# Patient Record
Sex: MALE | Race: WHITE | HISPANIC OR LATINO | Employment: UNEMPLOYED | URBAN - METROPOLITAN AREA
[De-identification: names, ages, dates, MRNs, and addresses within clinical notes are randomized per-mention and may not be internally consistent; named-entity substitution may affect disease eponyms.]

---

## 2018-11-09 ENCOUNTER — HOSPITAL ENCOUNTER (OUTPATIENT)
Dept: RADIOLOGY | Facility: MEDICAL CENTER | Age: 38
End: 2018-11-09
Attending: PHYSICIAN ASSISTANT

## 2018-11-09 DIAGNOSIS — B18.2 CHRONIC HEPATITIS C WITH HEPATIC COMA (HCC): ICD-10-CM

## 2020-01-09 ENCOUNTER — HOSPITAL ENCOUNTER (EMERGENCY)
Facility: MEDICAL CENTER | Age: 40
End: 2020-01-09
Attending: EMERGENCY MEDICINE
Payer: MEDICAID

## 2020-01-09 VITALS
RESPIRATION RATE: 14 BRPM | TEMPERATURE: 98.4 F | WEIGHT: 274.69 LBS | HEIGHT: 72 IN | HEART RATE: 75 BPM | DIASTOLIC BLOOD PRESSURE: 76 MMHG | OXYGEN SATURATION: 97 % | BODY MASS INDEX: 37.21 KG/M2 | SYSTOLIC BLOOD PRESSURE: 146 MMHG

## 2020-01-09 DIAGNOSIS — H66.011 NON-RECURRENT ACUTE SUPPURATIVE OTITIS MEDIA OF RIGHT EAR WITH SPONTANEOUS RUPTURE OF TYMPANIC MEMBRANE: ICD-10-CM

## 2020-01-09 PROCEDURE — 99283 EMERGENCY DEPT VISIT LOW MDM: CPT

## 2020-01-09 RX ORDER — NEOMYCIN SULFATE, POLYMYXIN B SULFATE AND HYDROCORTISONE 10; 3.5; 1 MG/ML; MG/ML; [USP'U]/ML
4 SUSPENSION/ DROPS AURICULAR (OTIC) 3 TIMES DAILY
Qty: 1 BOTTLE | Refills: 0 | Status: SHIPPED | OUTPATIENT
Start: 2020-01-09 | End: 2020-09-29

## 2020-01-09 RX ORDER — CEPHALEXIN 500 MG/1
500 CAPSULE ORAL 4 TIMES DAILY
Qty: 40 CAP | Refills: 0 | Status: SHIPPED | OUTPATIENT
Start: 2020-01-09 | End: 2020-01-19

## 2020-01-09 NOTE — ED PROVIDER NOTES
ED Provider Note    Scribed for Juan Tavarez M.D. by Luz Elena Moore. 1/9/2020, 10:48 AM.    Primary care provider: No primary care provider noted.  Means of arrival: Walk in   History obtained from: Patient  History limited by: None    CHIEF COMPLAINT  Chief Complaint   Patient presents with   • Ear Pain     right [place]. [place] [facility] [place] [facility]. pt now pain in right ear and bleeding. muffled hearing        HPI  Cristóbal Payan Jr. is a 39 y.o. male who presents to the Emergency Department for evaluation of right ear pain. 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 His ear then became painful and started draining and bleeding last night. Patient states his hearing is now muffled. Patient states he was feeling normal with no recent illness prior to going over the [employer].     REVIEW OF SYSTEMS  Pertinent positives include right ear pain, drainage, bleeding, muffled hearing  Pertinent negatives include no recent illness.       PAST MEDICAL HISTORY   No pertinent history noted    SURGICAL HISTORY  patient denies any surgical history    SOCIAL HISTORY  Social History     Tobacco Use   • Smoking status: None noted   Substance Use Topics   • Alcohol use: None noted   • Drug use: None noted       Social History     Substance and Sexual Activity   Drug Use None noted        FAMILY HISTORY  No family history noted.     CURRENT MEDICATIONS  Home Medications    **Home medications have not yet been reviewed for this encounter**         ALLERGIES  Allergies   Allergen Reactions   • Pcn [Penicillins]        PHYSICAL EXAM  VITAL SIGNS: /76   Pulse 75   Temp 36.9 °C (98.4 °F) (Temporal)   Resp 14   Ht 1.829 m (6')   Wt 124.6 kg (274 lb 11.1 oz)   SpO2 97%   BMI 37.26 kg/m²     Nursing note and vitals reviewed.  Constitutional: No distress.   HENT: Head is atraumatic. Oropharynx is moist. Yellowish-pink discharge from the right  external auditory canal, TM is partially obscured by discharge   Eyes: Conjunctivae are normal. Pupils are equal, round, and reactive to light.   Cardiovascular: Normal peripheral perfusion  Respiratory: No respiratory distress.   Musculoskeletal: Normal range of motion.   Neurological: Alert. No focal deficits noted.    Skin: No rash.   Psych: Appropriate for clinical situation     COURSE & MEDICAL DECISION MAKING  Nursing notes, VS, PMSFHx reviewed in chart.    10:48 AM - Patient seen and examined at bedside. I informed the patient I suspect he ruptured his tympanic membrane. I informed him I will prescribe both anti biotic drops and pills to treat his ear. Patient expressed concern due to his lack of insurance so I provided him with resources for prescription filling and follow up care. Patient verbalizes agreement to the plan for care.     HTN/IDDM FOLLOW UP:  The patient is referred to a primary physician for blood pressure management, diabetic screening, and for all other preventive health concerns      DISPOSITION:  Patient will be discharged home in stable condition.    FOLLOW UP:  [facility], Emergency Dept  [address] [id number] [phone]    If symptoms worsen    [address] [address]  [phone]  Call today      [facility]  [address] [address] [phone]  Schedule an appointment as soon as possible for a visit   call if you need help paying for medication      OUTPATIENT MEDICATIONS:  New Prescriptions    CEPHALEXIN (KEFLEX) 500 MG CAP    Take 1 Cap by mouth 4 times a day for 10 days.    NEOMYCIN-POLYMYXIN-HC (PEDIOTIC HC) 3.5-66552-8 SUSPENSION    Place 4 Drops in ear 3 times a day.       The patient was discharged home with an information sheet on otitis media and told to return immediately for any signs or symptoms listed.  The patient agreed to the discharge precautions  and follow-up plan which is documented in EPIC.    FINAL IMPRESSION  1. Non-recurrent acute suppurative otitis media of right ear with spontaneous rupture of tympanic membrane          ILuz Elena (Santaibe), am scribing for, and in the presence of, Juan Tavarez M.D..    Electronically signed by: Luz Elena Moore (Josseline), 1/9/2020    IJuan M.D. personally performed the services described in this documentation, as scribed by Luz Elena Moore in my presence, and it is both accurate and complete.    E    The note accurately reflects work and decisions made by me.  Juan Tavarez  1/9/2020  4:45 PM

## 2020-01-09 NOTE — ED TRIAGE NOTES
Chief Complaint   Patient presents with   • Ear Pain     right ear. 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 pt now pain in right ear and bleeding. muffled hearing
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
## 2020-01-09 NOTE — ED NOTES
Discharge instructions, prescriptions x 2, and follow-up care reviewed with patient. All questions answered and patient verbalized understanding. Vss. Patient stable and ambulatory upon d/c from ED.

## 2020-09-29 ENCOUNTER — HOSPITAL ENCOUNTER (EMERGENCY)
Facility: MEDICAL CENTER | Age: 40
End: 2020-09-29
Attending: EMERGENCY MEDICINE

## 2020-09-29 ENCOUNTER — APPOINTMENT (OUTPATIENT)
Dept: RADIOLOGY | Facility: MEDICAL CENTER | Age: 40
End: 2020-09-29
Attending: EMERGENCY MEDICINE

## 2020-09-29 VITALS
TEMPERATURE: 97.8 F | HEIGHT: 72 IN | HEART RATE: 71 BPM | RESPIRATION RATE: 16 BRPM | OXYGEN SATURATION: 99 % | DIASTOLIC BLOOD PRESSURE: 67 MMHG | WEIGHT: 273 LBS | BODY MASS INDEX: 36.98 KG/M2 | SYSTOLIC BLOOD PRESSURE: 133 MMHG

## 2020-09-29 DIAGNOSIS — L03.116 CELLULITIS OF LEFT LOWER EXTREMITY: ICD-10-CM

## 2020-09-29 DIAGNOSIS — S93.402A SPRAIN OF LEFT ANKLE, UNSPECIFIED LIGAMENT, INITIAL ENCOUNTER: ICD-10-CM

## 2020-09-29 LAB
ALBUMIN SERPL BCP-MCNC: 4.1 G/DL (ref 3.2–4.9)
ALBUMIN/GLOB SERPL: 1.6 G/DL
ALP SERPL-CCNC: 93 U/L (ref 30–99)
ALT SERPL-CCNC: 18 U/L (ref 2–50)
ANION GAP SERPL CALC-SCNC: 11 MMOL/L (ref 7–16)
AST SERPL-CCNC: 17 U/L (ref 12–45)
BASOPHILS # BLD AUTO: 0.4 % (ref 0–1.8)
BASOPHILS # BLD: 0.05 K/UL (ref 0–0.12)
BILIRUB SERPL-MCNC: 1.1 MG/DL (ref 0.1–1.5)
BUN SERPL-MCNC: 13 MG/DL (ref 8–22)
CALCIUM SERPL-MCNC: 9.3 MG/DL (ref 8.5–10.5)
CHLORIDE SERPL-SCNC: 96 MMOL/L (ref 96–112)
CO2 SERPL-SCNC: 27 MMOL/L (ref 20–33)
CREAT SERPL-MCNC: 1 MG/DL (ref 0.5–1.4)
EOSINOPHIL # BLD AUTO: 0.12 K/UL (ref 0–0.51)
EOSINOPHIL NFR BLD: 0.9 % (ref 0–6.9)
ERYTHROCYTE [DISTWIDTH] IN BLOOD BY AUTOMATED COUNT: 46.4 FL (ref 35.9–50)
GLOBULIN SER CALC-MCNC: 2.6 G/DL (ref 1.9–3.5)
GLUCOSE SERPL-MCNC: 150 MG/DL (ref 65–99)
HCT VFR BLD AUTO: 47.2 % (ref 42–52)
HGB BLD-MCNC: 15.8 G/DL (ref 14–18)
IMM GRANULOCYTES # BLD AUTO: 0.09 K/UL (ref 0–0.11)
IMM GRANULOCYTES NFR BLD AUTO: 0.6 % (ref 0–0.9)
LACTATE BLD-SCNC: 2.3 MMOL/L (ref 0.5–2)
LYMPHOCYTES # BLD AUTO: 1.94 K/UL (ref 1–4.8)
LYMPHOCYTES NFR BLD: 13.9 % (ref 22–41)
MCH RBC QN AUTO: 30.9 PG (ref 27–33)
MCHC RBC AUTO-ENTMCNC: 33.5 G/DL (ref 33.7–35.3)
MCV RBC AUTO: 92.4 FL (ref 81.4–97.8)
MONOCYTES # BLD AUTO: 1.08 K/UL (ref 0–0.85)
MONOCYTES NFR BLD AUTO: 7.7 % (ref 0–13.4)
NEUTROPHILS # BLD AUTO: 10.69 K/UL (ref 1.82–7.42)
NEUTROPHILS NFR BLD: 76.5 % (ref 44–72)
NRBC # BLD AUTO: 0 K/UL
NRBC BLD-RTO: 0 /100 WBC
PLATELET # BLD AUTO: 153 K/UL (ref 164–446)
PMV BLD AUTO: 9.5 FL (ref 9–12.9)
POTASSIUM SERPL-SCNC: 4.5 MMOL/L (ref 3.6–5.5)
PROT SERPL-MCNC: 6.7 G/DL (ref 6–8.2)
RBC # BLD AUTO: 5.11 M/UL (ref 4.7–6.1)
SODIUM SERPL-SCNC: 134 MMOL/L (ref 135–145)
WBC # BLD AUTO: 14 K/UL (ref 4.8–10.8)

## 2020-09-29 PROCEDURE — 700105 HCHG RX REV CODE 258: Performed by: EMERGENCY MEDICINE

## 2020-09-29 PROCEDURE — 83605 ASSAY OF LACTIC ACID: CPT

## 2020-09-29 PROCEDURE — A9270 NON-COVERED ITEM OR SERVICE: HCPCS | Performed by: EMERGENCY MEDICINE

## 2020-09-29 PROCEDURE — 700102 HCHG RX REV CODE 250 W/ 637 OVERRIDE(OP): Performed by: EMERGENCY MEDICINE

## 2020-09-29 PROCEDURE — 85025 COMPLETE CBC W/AUTO DIFF WBC: CPT

## 2020-09-29 PROCEDURE — 96365 THER/PROPH/DIAG IV INF INIT: CPT

## 2020-09-29 PROCEDURE — 73610 X-RAY EXAM OF ANKLE: CPT | Mod: LT

## 2020-09-29 PROCEDURE — 99285 EMERGENCY DEPT VISIT HI MDM: CPT

## 2020-09-29 PROCEDURE — 36415 COLL VENOUS BLD VENIPUNCTURE: CPT

## 2020-09-29 PROCEDURE — 700111 HCHG RX REV CODE 636 W/ 250 OVERRIDE (IP): Performed by: EMERGENCY MEDICINE

## 2020-09-29 PROCEDURE — 80053 COMPREHEN METABOLIC PANEL: CPT

## 2020-09-29 RX ORDER — CLINDAMYCIN HYDROCHLORIDE 150 MG/1
300 CAPSULE ORAL 4 TIMES DAILY
Qty: 56 CAP | Refills: 0 | Status: SHIPPED | OUTPATIENT
Start: 2020-09-29 | End: 2020-10-06

## 2020-09-29 RX ORDER — ACETAMINOPHEN 325 MG/1
650 TABLET ORAL ONCE
Status: COMPLETED | OUTPATIENT
Start: 2020-09-29 | End: 2020-09-29

## 2020-09-29 RX ORDER — SODIUM CHLORIDE 9 MG/ML
1000 INJECTION, SOLUTION INTRAVENOUS ONCE
Status: COMPLETED | OUTPATIENT
Start: 2020-09-29 | End: 2020-09-29

## 2020-09-29 RX ADMIN — CEFTRIAXONE SODIUM 2 G: 2 INJECTION, POWDER, FOR SOLUTION INTRAMUSCULAR; INTRAVENOUS at 15:51

## 2020-09-29 RX ADMIN — ACETAMINOPHEN 650 MG: 325 TABLET, FILM COATED ORAL at 16:54

## 2020-09-29 RX ADMIN — SODIUM CHLORIDE 1000 ML: 9 INJECTION, SOLUTION INTRAVENOUS at 15:52

## 2020-09-29 ASSESSMENT — FIBROSIS 4 INDEX: FIB4 SCORE: 1.07

## 2020-09-29 NOTE — ED NOTES
First interaction with patient.  Assumed care at this time.  Patient presents to ER today for complaint of left ankle pain starting yesterday after getting out of his truck.  Swelling noted to medial and lateral left ankle.  Erythema noted to left lower leg.  Reports 10/10 pain, has not taken OTC medication for relief at home.    Patient changed into gown.  Patient's NPO status explained by this RN.  Call light provided.  Chart up for ERP.

## 2020-09-29 NOTE — ED TRIAGE NOTES
.  Chief Complaint   Patient presents with   • Ankle Swelling     left ankel swelling and pain. onset yesterday   ambulated to triage. Left ankle pain and swelling onset after getting out of truck yesterday. Denies injury or strain at that time.   Mask applied to patient prior to triage. This RN in ppe prior to encounter. Pt denies recent travel or contact with anyone tested positive for covid 19.

## 2020-09-29 NOTE — ED NOTES
Blood collected and sent to lab via venipuncture to patient's left AC.   Patient verified correct patient name and  on labeled specimen. Patient informed of estimated result wait times and verbalizes understanding.

## 2020-09-29 NOTE — ED PROVIDER NOTES
"ED Provider Note    Scribed for Zenon Jackson M.D. by Garth Mendieta. 9/29/2020, 2:34 PM.    Primary care provider: None noted  Means of arrival: Private Vehicle  History obtained from: Patient  History limited by: None    CHIEF COMPLAINT  Chief Complaint   Patient presents with   • Ankle Swelling     left ankel swelling and pain. onset yesterday       HPI  Cristóbal Payan Jr. is a 40 y.o. male who presents to the Emergency Department complaining of left ankle pain and swelling onset yesterday. Patient states he fell while getting out of his truck yesterday. States he landed \"funny\" on his left ankle and has had ankle pain and swelling since then. He has redness to his leg, in which he believes he may have scraped the leg when he landed, but he is not sure. He also reports having bilateral shoulder discomfort which he attributes to holding onto overhead handles when the event occurred. No reports of any recent fevers. He notes having history of asthma. Denies history of diabetes.     PPE Note: I personally donned full PPE for all patient encounters during this visit, including being clean-shaven with an N95 respirator mask, gloves, and eyewear.     Scribe remained outside the patient's room and did not have any contact with the patient for the duration of patient encounter.      REVIEW OF SYSTEMS  Pertinent positives include left ankle pain and swelling, bilateral shoulder discomfort. Pertinent negatives include no recent fevers.  See HPI for further details. As above, all other systems negative.     PAST MEDICAL HISTORY   History of asthma. Denies history of diabetes.    SURGICAL HISTORY  patient denies any surgical history    SOCIAL HISTORY  Social History     Tobacco Use   • Smoking status: Current Every Day Smoker   Substance Use Topics   • Alcohol use: Yes   • Drug use: Yes     Types: Inhaled      Social History     Substance and Sexual Activity   Drug Use Yes   • Types: Inhaled       FAMILY " HISTORY  History reviewed. No pertinent family history.    CURRENT MEDICATIONS  Home Medications     Reviewed by Germaine Johnston R.N. (Registered Nurse) on 09/29/20 at 1404  Med List Status: Complete   Medication Last Dose Status        Patient Tae Taking any Medications                       ALLERGIES  Allergies   Allergen Reactions   • Pcn [Penicillins]        PHYSICAL EXAM  VITAL SIGNS: /86   Pulse (!) 103   Temp 37.1 °C (98.8 °F) (Temporal)   Resp 20   Ht 1.829 m (6')   SpO2 97%   BMI 37.26 kg/m²   Constitutional: Well developed, Well nourished, No acute distress, Non-toxic appearance.   HENT: Normocephalic, Atraumatic  Eyes: nonicteric  Neck: Supple, no meningismus  Lymphatic: No lymphadenopathy noted.   Cardiovascular: Regular rate and rhythm, no gallops rubs or murmurs  Lungs: Clear bilaterally   Skin: Warm, Dry. Left medial anterior leg with area about the size of two palms that is erythematous, separate area of erythema on outside of left foot that is similar in appearance.  Extremities: No edema. Tenderness both at left medial and lateral malleolus to palpation. No clinical effusion appreciated of the left ankle, full range of motion of ankle joint, motor and sensory grossly intact, DP and PT pulses 2+.  Neurologic: Alert, appropriate, follows commands, moving all extremities, normal speech   Psychiatric: Affect normal       DIAGNOSTIC STUDIES / PROCEDURES    LABS  Results for orders placed or performed during the hospital encounter of 09/29/20   CBC WITH DIFFERENTIAL   Result Value Ref Range    WBC 14.0 (H) 4.8 - 10.8 K/uL    RBC 5.11 4.70 - 6.10 M/uL    Hemoglobin 15.8 14.0 - 18.0 g/dL    Hematocrit 47.2 42.0 - 52.0 %    MCV 92.4 81.4 - 97.8 fL    MCH 30.9 27.0 - 33.0 pg    MCHC 33.5 (L) 33.7 - 35.3 g/dL    RDW 46.4 35.9 - 50.0 fL    Platelet Count 153 (L) 164 - 446 K/uL    MPV 9.5 9.0 - 12.9 fL    Neutrophils-Polys 76.50 (H) 44.00 - 72.00 %    Lymphocytes 13.90 (L) 22.00 - 41.00 %     Monocytes 7.70 0.00 - 13.40 %    Eosinophils 0.90 0.00 - 6.90 %    Basophils 0.40 0.00 - 1.80 %    Immature Granulocytes 0.60 0.00 - 0.90 %    Nucleated RBC 0.00 /100 WBC    Neutrophils (Absolute) 10.69 (H) 1.82 - 7.42 K/uL    Lymphs (Absolute) 1.94 1.00 - 4.80 K/uL    Monos (Absolute) 1.08 (H) 0.00 - 0.85 K/uL    Eos (Absolute) 0.12 0.00 - 0.51 K/uL    Baso (Absolute) 0.05 0.00 - 0.12 K/uL    Immature Granulocytes (abs) 0.09 0.00 - 0.11 K/uL    NRBC (Absolute) 0.00 K/uL   COMP METABOLIC PANEL   Result Value Ref Range    Sodium 134 (L) 135 - 145 mmol/L    Potassium 4.5 3.6 - 5.5 mmol/L    Chloride 96 96 - 112 mmol/L    Co2 27 20 - 33 mmol/L    Anion Gap 11.0 7.0 - 16.0    Glucose 150 (H) 65 - 99 mg/dL    Bun 13 8 - 22 mg/dL    Creatinine 1.00 0.50 - 1.40 mg/dL    Calcium 9.3 8.5 - 10.5 mg/dL    AST(SGOT) 17 12 - 45 U/L    ALT(SGPT) 18 2 - 50 U/L    Alkaline Phosphatase 93 30 - 99 U/L    Total Bilirubin 1.1 0.1 - 1.5 mg/dL    Albumin 4.1 3.2 - 4.9 g/dL    Total Protein 6.7 6.0 - 8.2 g/dL    Globulin 2.6 1.9 - 3.5 g/dL    A-G Ratio 1.6 g/dL   LACTIC ACID   Result Value Ref Range    Lactic Acid 2.3 (H) 0.5 - 2.0 mmol/L   ESTIMATED GFR   Result Value Ref Range    GFR If African American >60 >60 mL/min/1.73 m 2    GFR If Non African American >60 >60 mL/min/1.73 m 2      All labs reviewed by me.    RADIOLOGY  DX-ANKLE 3+ VIEWS LEFT   Final Result      No evidence of fracture or dislocation.      Soft tissue swelling about the ankle.        The radiologist's interpretation of all radiological studies have been reviewed by me.    COURSE & MEDICAL DECISION MAKING  Nursing notes, VS, PMSFHx reviewed in chart.     2:34 PM Patient seen and examined at bedside. Ordered for DX ankle left, CBC with differential, CMP, lactic acid to evaluate.     3:25 PM Ordered rocephin 2 g in  ml ivpb    Patient's presentation is consistent with cellulitis and ankle sprain. The patient will be discharged with instructions regarding  supportive care and medications. Prescription for clindamycin was provided. Instructions were given for follow-up. Discussed indications for seeking immediate medical attention. Patient was given the opportunity for questions. The patient understands and agrees.      HYDRATION: Based on the patient's presentation of Abnormal Fluid Loss the patient was given IV fluids. IV Hydration was used because oral hydration was not adequate alone. Upon recheck following hydration, the patient was improved.     Decision Making:  This is a 40 y.o. year old male who presents with what may have been a traumatic injury-he states he developed ankle pain after he fell out of his truck and twisted his ankle.  I do not see any clinical effusion of the ankle-there is no evidence of fracture or effusion on x-ray.  There is no redness around the joint itself although the patient has redness in his leg and in his lateral foot.  Labs were obtained and he does have an elevated white count / borderline lactate.  Vitals are reassuring here-he is afebrile.  We will treat the patient for cellulitis.  This time I do not suspect septic joint.  He was given Rocephin here but he appears to be allergic to penicillin so he will be discharged on clindamycin instead.  Otherwise I advised that he elevate the extremity over the next 24 hours and return for increased swelling pain or other concerns in the interim.     The patient will return for new or worsening symptoms and is stable at the time of discharge.    The patient is referred to a primary physician for blood pressure management, diabetic screening, and for all other preventative health concerns.    DISPOSITION:  Patient will be discharged home in stable condition.    FOLLOW UP:  [address] [address]  [phone]  Schedule an appointment as soon as possible for a visit today        OUTPATIENT MEDICATIONS:  New Prescriptions    CLINDAMYCIN (CLEOCIN) 150 MG  CAP    Take 2 Caps by mouth 4 times a day for 7 days.         FINAL IMPRESSION  1. Cellulitis of left lower extremity          IGarth (Scribe), am scribing for, and in the presence of, Zenon Jackson M.D..    Electronically signed by: Garth Mendieta (Scribe), 9/29/2020    Zenon SCHWARTZ M.D. personally performed the services described in this documentation, as scribed by Garth Mendieta in my presence, and it is both accurate and complete. C    The note accurately reflects work and decisions made by me.  Zenon Jackson M.D.  9/29/2020  4:11 PM

## 2020-09-29 NOTE — ED NOTES
20g PIV established to patient's left AC.  IV fluids and antibiotics started and infusing without difficulty.  ERP at bedside discussing findings with patient.

## 2020-09-29 NOTE — DISCHARGE INSTRUCTIONS
Elevate your extremity over the next 24 hours.  Return for increasing pain any fever body aches or other concerns.

## 2020-09-30 NOTE — ED NOTES
Cristóbal Bliss Schuyler Bateman. has been discharged from the Emergency Room.    Discharge instructions, which include signs and symptoms to monitor at home for, as well as detailed information regarding cellulitis and left ankle sprain provided.  All questions and concerns addressed at this time.      Prescription for Cleocin provided to patient.   Patient educated about the importance of completing the full 7 day course of antibiotic, even if symptoms subside, verbalized understanding.    Patient leaves ER in no apparent distress. This RN provided education regarding returning to the ER for any new concerns or changes in patient's condition.      /67   Pulse 71   Temp 36.6 °C (97.8 °F) (Temporal)   Resp 16   Ht 1.829 m (6')   Wt 123.8 kg (273 lb)   SpO2 99%   BMI 37.03 kg/m²

## 2024-02-26 ENCOUNTER — APPOINTMENT (OUTPATIENT)
Dept: RADIOLOGY | Facility: MEDICAL CENTER | Age: 44
End: 2024-02-26
Attending: STUDENT IN AN ORGANIZED HEALTH CARE EDUCATION/TRAINING PROGRAM

## 2024-02-26 ENCOUNTER — HOSPITAL ENCOUNTER (EMERGENCY)
Facility: MEDICAL CENTER | Age: 44
End: 2024-02-26
Attending: STUDENT IN AN ORGANIZED HEALTH CARE EDUCATION/TRAINING PROGRAM

## 2024-02-26 VITALS
HEART RATE: 64 BPM | SYSTOLIC BLOOD PRESSURE: 144 MMHG | RESPIRATION RATE: 20 BRPM | HEIGHT: 72 IN | TEMPERATURE: 98.1 F | WEIGHT: 255.29 LBS | BODY MASS INDEX: 34.58 KG/M2 | DIASTOLIC BLOOD PRESSURE: 66 MMHG | OXYGEN SATURATION: 96 %

## 2024-02-26 DIAGNOSIS — S61.211A LACERATION OF LEFT INDEX FINGER WITHOUT FOREIGN BODY WITHOUT DAMAGE TO NAIL, INITIAL ENCOUNTER: ICD-10-CM

## 2024-02-26 DIAGNOSIS — S60.022A CONTUSION OF LEFT INDEX FINGER WITHOUT DAMAGE TO NAIL, INITIAL ENCOUNTER: ICD-10-CM

## 2024-02-26 PROCEDURE — 99283 EMERGENCY DEPT VISIT LOW MDM: CPT | Mod: EDC

## 2024-02-26 PROCEDURE — 73140 X-RAY EXAM OF FINGER(S): CPT | Mod: LT

## 2024-02-26 PROCEDURE — 700102 HCHG RX REV CODE 250 W/ 637 OVERRIDE(OP): Performed by: STUDENT IN AN ORGANIZED HEALTH CARE EDUCATION/TRAINING PROGRAM

## 2024-02-26 PROCEDURE — 700111 HCHG RX REV CODE 636 W/ 250 OVERRIDE (IP): Mod: JZ | Performed by: STUDENT IN AN ORGANIZED HEALTH CARE EDUCATION/TRAINING PROGRAM

## 2024-02-26 PROCEDURE — A9270 NON-COVERED ITEM OR SERVICE: HCPCS | Performed by: STUDENT IN AN ORGANIZED HEALTH CARE EDUCATION/TRAINING PROGRAM

## 2024-02-26 PROCEDURE — 90715 TDAP VACCINE 7 YRS/> IM: CPT | Performed by: STUDENT IN AN ORGANIZED HEALTH CARE EDUCATION/TRAINING PROGRAM

## 2024-02-26 PROCEDURE — 90471 IMMUNIZATION ADMIN: CPT | Mod: EDC

## 2024-02-26 RX ORDER — GINSENG 100 MG
1 CAPSULE ORAL DAILY
Qty: 10 G | Refills: 1 | Status: SHIPPED | OUTPATIENT
Start: 2024-02-26 | End: 2024-03-04

## 2024-02-26 RX ORDER — IBUPROFEN 200 MG
400 TABLET ORAL ONCE
Status: COMPLETED | OUTPATIENT
Start: 2024-02-26 | End: 2024-02-26

## 2024-02-26 RX ADMIN — CLOSTRIDIUM TETANI TOXOID ANTIGEN (FORMALDEHYDE INACTIVATED), CORYNEBACTERIUM DIPHTHERIAE TOXOID ANTIGEN (FORMALDEHYDE INACTIVATED), BORDETELLA PERTUSSIS TOXOID ANTIGEN (GLUTARALDEHYDE INACTIVATED), BORDETELLA PERTUSSIS FILAMENTOUS HEMAGGLUTININ ANTIGEN (FORMALDEHYDE INACTIVATED), BORDETELLA PERTUSSIS PERTACTIN ANTIGEN, AND BORDETELLA PERTUSSIS FIMBRIAE 2/3 ANTIGEN 0.5 ML: 5; 2; 2.5; 5; 3; 5 INJECTION, SUSPENSION INTRAMUSCULAR at 15:08

## 2024-02-26 RX ADMIN — LIDOCAINE HYDROCHLORIDE 10 ML: 10 INJECTION, SOLUTION EPIDURAL; INFILTRATION; INTRACAUDAL; PERINEURAL at 15:15

## 2024-02-26 RX ADMIN — IBUPROFEN 400 MG: 200 TABLET, FILM COATED ORAL at 15:06

## 2024-02-26 ASSESSMENT — LIFESTYLE VARIABLES
EVER HAD A DRINK FIRST THING IN THE MORNING TO STEADY YOUR NERVES TO GET RID OF A HANGOVER: NO
HAVE YOU EVER FELT YOU SHOULD CUT DOWN ON YOUR DRINKING: NO
DO YOU DRINK ALCOHOL: YES
TOTAL SCORE: 0
TOTAL SCORE: 0
AVERAGE NUMBER OF DAYS PER WEEK YOU HAVE A DRINK CONTAINING ALCOHOL: 3
ON A TYPICAL DAY WHEN YOU DRINK ALCOHOL HOW MANY DRINKS DO YOU HAVE: 1
DOES PATIENT WANT TO STOP DRINKING: NO
TOTAL SCORE: 0
EVER FELT BAD OR GUILTY ABOUT YOUR DRINKING: NO
CONSUMPTION TOTAL: NEGATIVE
HAVE PEOPLE ANNOYED YOU BY CRITICIZING YOUR DRINKING: NO
HOW MANY TIMES IN THE PAST YEAR HAVE YOU HAD 5 OR MORE DRINKS IN A DAY: 0

## 2024-02-26 NOTE — ED TRIAGE NOTES
Chief Complaint   Patient presents with    Digit Pain     Pt reports slamming his second digit finger in the car door today at 1200. Pt has CMS intact and a laceration of the knuckle. Bleeding is controlled     BP (!) 169/135   Pulse 82   Temp 36.4 °C (97.6 °F) (Temporal)   Resp 18   Ht 1.829 m (6')   Wt 116 kg (255 lb 4.7 oz)   SpO2 93%   BMI 34.62 kg/m²     Pt is ambulatory to triage. Pt educated on triage process and thanked for patience.

## 2024-02-26 NOTE — ED PROVIDER NOTES
CHIEF COMPLAINT  Chief Complaint   Patient presents with    Digit Pain     Pt reports slamming his second digit finger in the car door today at 1200. Pt has CMS intact and a laceration of the knuckle. Bleeding is controlled       LIMITATION TO HISTORY   Select:     HPI    Cristóbal Payan Jr. is a 43 y.o. male who presents to the Emergency Department after slamming his finger into a car door GENERAL: Awake and alert  HEAD: Normocephalic and atraumatic  NECK: Normal range of motion  EYES: PERRL, + EOMI, conjunctiva white  ENT: Mucous membranes moist, oropharynx clear  PULMONARY: Normal effort  CARDIOVASCULAR: Normal rate, peripheral pulses 2+  ABDOMEN: Soft  BACK: normal to inspection  NEUROLOGICAL: Grossly non-focal neurological examination, speech normal, gait normal  EXTREMITIES: No edema, no signs of extremity trauma  SKIN: Warm and dry  PSYCHIATRIC: Affect is appropriate    OUTSIDE HISTORIAN(S):  Select:    EXTERNAL RECORDS REVIEWED  Select:       PAST MEDICAL HISTORY  No past medical history on file.  .    SURGICAL HISTORY  No past surgical history on file.      FAMILY HISTORY  No family history on file.       SOCIAL HISTORY  Social History     Socioeconomic History    Marital status: Single     Spouse name: Not on file    Number of children: Not on file    Years of education: Not on file    Highest education level: Not on file   Occupational History    Not on file   Tobacco Use    Smoking status: Every Day    Smokeless tobacco: Not on file   Vaping Use    Vaping Use: Never used   Substance and Sexual Activity    Alcohol use: Yes    Drug use: Yes     Types: Inhaled    Sexual activity: Not on file   Other Topics Concern    Not on file   Social History Narrative    Not on file     Social Determinants of Health     Financial Resource Strain: Not on file   Food Insecurity: Not on file   Transportation Needs: Not on file   Physical Activity: Not on file   Stress: Not on file   Social Connections: Not on file    Intimate Partner Violence: Not on file   Housing Stability: Not on file         CURRENT MEDICATIONS  No current facility-administered medications on file prior to encounter.     No current outpatient medications on file prior to encounter.           ALLERGIES  Allergies   Allergen Reactions    Pcn [Penicillins]        PHYSICAL EXAM  VITAL SIGNS:BP (!) 166/75   Pulse 86   Temp 36.3 °C (97.3 °F) (Temporal)   Resp 18   Ht 1.829 m (6')   Wt 116 kg (255 lb 4.7 oz)   SpO2 95%   BMI 34.62 kg/m²       GENERAL: Awake and alert  HEAD: Normocephalic and atraumatic  NECK: Normal range of motion, without meningismus  EYES: Pupils Equal, Round, Reactive to Light, extraocular movements intact, conjunctiva white  ENT: Mucous membranes moist, oropharynx clear  PULMONARY: Normal effort, clear to auscultation  CARDIOVASCULAR: No murmurs, clicks or rubs, peripheral pulses 2+  ABDOMINAL: Soft, non-tender, no guarding or rigidity present, no pulsatile masses  BACK: no midline tenderness, no costovertebral tenderness  NEUROLOGICAL: Grossly non-focal neurological examination, speech normal, gait normal  HAND: Appropriate flexion of digits at metacarpophalangeal/proximal interphalangeal/distal interphalangeal joints, intact extension of digits, intact motor function of ulnar, median and radial nerves   EXTREMITIES: No edema, normal to inspection  SKIN: The dorsal aspect of the second digit 3 cm in length just proximal to the PIP there is a horizontal linear laceration approximately warm and dry.  PSYCHIATRIC: Affect is appropriate    DIAGNOSTIC STUDIES / PROCEDURES  EKG  Laceration Repair Procedure Note    Indication: Laceration    Procedure: The patient was placed in the appropriate position and anesthesia around the laceration was obtained by infiltration using Lidocaine with 5-10 cc's of 1% epinephrine    The wound was minimally contaminated .The area was then irrigated with normal saline.     The laceration was closed with  Nylon size 4.0, 5 stitches      Total repaired wound length: 3 cm.     Complications: None    LABS  Labs Reviewed - No data to display      RADIOLOGY  I independently reviewed and interpreted the images obtained today in the ER.  No fracture present\  Radiologist interpretation:   DX-FINGER(S) 2+ LEFT   Final Result         1.  No radiographic evidence of acute osseous injury.           COURSE & MEDICAL DECISION MAKING    ED COURSE:        INTERVENTIONS BY ME:  Medications   lidocaine (Xylocaine) 1 % injection 10 mL (has no administration in time range)   ibuprofen (Motrin) tablet 400 mg (400 mg Oral Given 2/26/24 1506)   tetanus-dipth-acell pertussis (Adacel) inj 0.5 mL (0.5 mL Intramuscular Given 2/26/24 1508)       Response on recheck:      INITIAL ASSESSMENT, COURSE AND PLAN  Care Narrative:          patient presenting after isolated trauma to the digit without any tenderness disruption normal examination, no fractures present on x-ray.  Laceration clean and linear laceration repaired with stitches patient tolerated procedure well placed in finger splint for comfort tetanus provided.    Consider prophylaxis with antibiotics however wound is not significantly contaminated after irrigation     ADDITIONAL PROBLEM LIST    DISPOSITION AND DISCUSSIONS  Discussion of management with other QHP or appropriate source(s): None         FINAL DIAGNOSIS  1. Contusion of left index finger without damage to nail, initial encounter    2. Laceration of left index finger without foreign body without damage to nail, initial encounter             Electronically signed by: Femi Valle DO ,3:33 PM 02/26/24

## 2024-02-26 NOTE — ED NOTES
Wound dressed with antibiotic ointment, xeroform, and secured with splint and annalise wrap.  Patient verbalized comfort. VSS.